# Patient Record
Sex: FEMALE | Race: WHITE | NOT HISPANIC OR LATINO | Employment: OTHER | URBAN - METROPOLITAN AREA
[De-identification: names, ages, dates, MRNs, and addresses within clinical notes are randomized per-mention and may not be internally consistent; named-entity substitution may affect disease eponyms.]

---

## 2023-12-01 ENCOUNTER — OFFICE VISIT (OUTPATIENT)
Dept: PODIATRY | Facility: CLINIC | Age: 73
End: 2023-12-01
Payer: COMMERCIAL

## 2023-12-01 DIAGNOSIS — M79.674 TOE PAIN, RIGHT: ICD-10-CM

## 2023-12-01 DIAGNOSIS — L84 CORNS AND CALLOSITIES: Primary | ICD-10-CM

## 2023-12-01 DIAGNOSIS — E11.42 DIABETIC POLYNEUROPATHY ASSOCIATED WITH TYPE 2 DIABETES MELLITUS (MULTI): ICD-10-CM

## 2023-12-01 DIAGNOSIS — M79.675 TOE PAIN, LEFT: ICD-10-CM

## 2023-12-01 DIAGNOSIS — B35.1 TINEA UNGUIUM: ICD-10-CM

## 2023-12-01 DIAGNOSIS — M79.671 RIGHT FOOT PAIN: ICD-10-CM

## 2023-12-01 PROCEDURE — 11055 PARING/CUTG B9 HYPRKER LES 1: CPT | Performed by: PODIATRIST

## 2023-12-01 PROCEDURE — 11721 DEBRIDE NAIL 6 OR MORE: CPT | Performed by: PODIATRIST

## 2023-12-01 RX ORDER — EZETIMIBE AND SIMVASTATIN 10; 10 MG/1; MG/1
1 TABLET ORAL NIGHTLY
COMMUNITY
Start: 2023-10-04 | End: 2024-09-28

## 2023-12-01 RX ORDER — VIT C/E/ZN/COPPR/LUTEIN/ZEAXAN 250MG-90MG
1000 CAPSULE ORAL
COMMUNITY
Start: 2021-05-24

## 2023-12-01 RX ORDER — GLIMEPIRIDE 4 MG/1
1 TABLET ORAL
COMMUNITY
Start: 2023-09-19 | End: 2024-03-17

## 2023-12-01 RX ORDER — KETOCONAZOLE 20 MG/G
CREAM TOPICAL
COMMUNITY
Start: 2023-10-04

## 2023-12-01 RX ORDER — SITAGLIPTIN 100 MG/1
100 TABLET, FILM COATED ORAL DAILY
COMMUNITY

## 2023-12-01 RX ORDER — LANCETS 30 GAUGE
EACH MISCELLANEOUS
COMMUNITY
Start: 2023-09-19

## 2023-12-01 RX ORDER — PANTOPRAZOLE SODIUM 40 MG/1
40 TABLET, DELAYED RELEASE ORAL
COMMUNITY
Start: 2023-10-04 | End: 2024-09-28

## 2023-12-01 RX ORDER — CELECOXIB 200 MG/1
200 CAPSULE ORAL
COMMUNITY
Start: 2023-10-04 | End: 2024-09-28

## 2023-12-01 RX ORDER — LANCETS 33 GAUGE
EACH MISCELLANEOUS
COMMUNITY
Start: 2023-09-19

## 2023-12-01 RX ORDER — METFORMIN HYDROCHLORIDE 500 MG/1
1 TABLET ORAL
COMMUNITY
Start: 2020-06-26 | End: 2024-11-01

## 2023-12-01 RX ORDER — SERTRALINE HYDROCHLORIDE 25 MG/1
25 TABLET, FILM COATED ORAL DAILY
COMMUNITY

## 2023-12-01 RX ORDER — TRIAMTERENE/HYDROCHLOROTHIAZID 37.5-25 MG
1 TABLET ORAL EVERY OTHER DAY
COMMUNITY
Start: 2023-10-04 | End: 2024-09-28

## 2023-12-01 RX ORDER — LEVOTHYROXINE SODIUM 50 UG/1
50 TABLET ORAL
COMMUNITY

## 2023-12-01 RX ORDER — CLOPIDOGREL BISULFATE 75 MG/1
75 TABLET ORAL DAILY
COMMUNITY

## 2023-12-01 NOTE — PROGRESS NOTES
CC:  painful thickened and elongated toenails and diabetic care.     HPI: Pt presents for dm foot exam and painful thickened and elongated toenails that are difficult to manage.  Onset was gradual with worsening course until recently.  Aggravated by shoe gear and ambulation.       PCP: Dr. Alvarez  Last visit: 10-4-23     PMH  No past medical history on file.  MEDS    Current Outpatient Medications:     celecoxib (CeleBREX) 200 mg capsule, Take 1 capsule (200 mg) by mouth once daily., Disp: , Rfl:     cholecalciferol (Vitamin D-3) 25 MCG (1000 UT) capsule, Take 1 capsule (25 mcg) by mouth once daily., Disp: , Rfl:     ezetimibe-simvastatin (Vytorin) 10-10 mg tablet, Take 1 tablet by mouth once daily at bedtime., Disp: , Rfl:     glimepiride (Amaryl) 4 mg tablet, Take 1 tablet (4 mg) by mouth once daily with a meal., Disp: , Rfl:     ketoconazole (NIZOral) 2 % cream, APPLY TOPICALLY TO THE AFFECTED AREA EVERY DAY, Disp: , Rfl:     metFORMIN (Glucophage) 500 mg tablet, Take 1 tablet (500 mg) by mouth 2 times a day with meals., Disp: , Rfl:     OneTouch Delica Plus Lancet 33 gauge misc, AS DIRECTED TWICE DAILY, Disp: , Rfl:     OneTouch Verio Flex meter misc, USE DAILY AS DIRECTED, Disp: , Rfl:     pantoprazole (ProtoNix) 40 mg EC tablet, Take 1 tablet (40 mg) by mouth., Disp: , Rfl:     triamterene-hydrochlorothiazid (Maxzide-25) 37.5-25 mg tablet, Take 1 tablet by mouth every other day., Disp: , Rfl:     clopidogrel (Plavix) 75 mg tablet, Take 1 tablet (75 mg) by mouth once daily., Disp: , Rfl:     Januvia 100 mg tablet, Take 1 tablet (100 mg) by mouth once daily., Disp: , Rfl:     levothyroxine (Synthroid, Levoxyl) 50 mcg tablet, Take 1 tablet (50 mcg) by mouth once daily in the morning. Take before meals., Disp: , Rfl:     sertraline (Zoloft) 25 mg tablet, Take 1 tablet (25 mg) by mouth once daily., Disp: , Rfl:   Allergies  Allergies   Allergen Reactions    Azithromycin GI Upset and Other     Burning upset stomach      Burning upset stomach    Penicillins Other    Statins-Hmg-Coa Reductase Inhibitors Other     Patient reports muscular trouble in hands with this med; Additionally, patient reports mother had lasting deficits after taking this med.    Patient reports muscular trouble in hands with this med.; Additionally, patient reports mother had lasting deficits after taking this med.     Social History     Socioeconomic History    Marital status:      Spouse name: Not on file    Number of children: Not on file    Years of education: Not on file    Highest education level: Not on file   Occupational History    Not on file   Tobacco Use    Smoking status: Never    Smokeless tobacco: Never   Substance and Sexual Activity    Alcohol use: Not on file    Drug use: Never    Sexual activity: Not on file   Other Topics Concern    Not on file   Social History Narrative    Not on file     Social Determinants of Health     Financial Resource Strain: Not on file   Food Insecurity: Not on file   Transportation Needs: Not on file   Physical Activity: Not on file   Stress: Not on file   Social Connections: Not on file   Intimate Partner Violence: Not on file   Housing Stability: Not on file     No family history on file.  No past surgical history on file.    REVIEW OF SYSTEMS  DERM:   + as noted in HPI.       Physical examination:   On General Observation: Patient is a pleasant, cooperative, well developed 73 y.o. diabetic   adult female. The patient is alert and oriented to time, place and person. Patient has normal affect and mood.  There were no vitals taken for this visit.    Vascular:  DP and PT pulses are 2/4 b/l.  mild edema noted. mild varicosities b/l.  CFT  6 seconds to all digits bilateral.  Skin temperature is warm to warm from proximal to distal bilateral.      Muscular: Strength is 5/5 for all instrinsic and extrinsic muscle groups.     Neuro:  Proprioception decreased.   Sensation to vibration is  decreased. Protective  sensation decreased  at all pedal sites via [place] 5.07 monofilament bilateral.  Light touch decreased bilateral.     Derm:    Left toenails: 1-5 Brittleness, crumbling upon debridement, subungual debris, elongation, mycotic appearance, tenderness, and thickness.   Right toenails: 1-5 Brittleness, crumbling upon debridement, subungual debris, elongation, mycotic appearance, tenderness, and thickness.   Hair growth is decreased b/l le  Callus right hallux ipj    ASSESSMENT:    Callus right foot  Pain right foot    Tinea Unguium [B35.1]   E11.42  Pain in right toe(s) [M79.674]   Pain in left toe(s) [M79.675]       PLAN:   Dm foot exam  Debrided callus right hallux with sharp debridement  Debrided nails 1-10 in length and height  Follow up 9 weeks      Fransico Babb DPM

## 2024-03-01 ENCOUNTER — PROCEDURE VISIT (OUTPATIENT)
Dept: PODIATRY | Facility: CLINIC | Age: 74
End: 2024-03-01
Payer: COMMERCIAL

## 2024-03-01 DIAGNOSIS — E11.42 DIABETIC POLYNEUROPATHY ASSOCIATED WITH TYPE 2 DIABETES MELLITUS (MULTI): ICD-10-CM

## 2024-03-01 DIAGNOSIS — B35.1 TINEA UNGUIUM: ICD-10-CM

## 2024-03-01 DIAGNOSIS — M79.675 TOE PAIN, LEFT: ICD-10-CM

## 2024-03-01 DIAGNOSIS — M79.674 TOE PAIN, RIGHT: Primary | ICD-10-CM

## 2024-03-01 PROCEDURE — 11721 DEBRIDE NAIL 6 OR MORE: CPT | Performed by: PODIATRIST

## 2024-03-01 NOTE — PROGRESS NOTES
CC:  painful thickened and elongated toenails and diabetic care.     HPI: Pt presents for dm foot exam and painful thickened and elongated toenails that are difficult to manage.  Onset was gradual with worsening course until recently.  Aggravated by shoe gear and ambulation.  Sugars are a bit elevated      PCP: Dr. Alvarez  Last visit: 11-13-23     PMH  No past medical history on file.  MEDS    Current Outpatient Medications:     celecoxib (CeleBREX) 200 mg capsule, Take 1 capsule (200 mg) by mouth once daily., Disp: , Rfl:     cholecalciferol (Vitamin D-3) 25 MCG (1000 UT) capsule, Take 1 capsule (25 mcg) by mouth once daily., Disp: , Rfl:     clopidogrel (Plavix) 75 mg tablet, Take 1 tablet (75 mg) by mouth once daily., Disp: , Rfl:     ezetimibe-simvastatin (Vytorin) 10-10 mg tablet, Take 1 tablet by mouth once daily at bedtime., Disp: , Rfl:     glimepiride (Amaryl) 4 mg tablet, Take 1 tablet (4 mg) by mouth once daily with a meal., Disp: , Rfl:     Januvia 100 mg tablet, Take 1 tablet (100 mg) by mouth once daily., Disp: , Rfl:     ketoconazole (NIZOral) 2 % cream, APPLY TOPICALLY TO THE AFFECTED AREA EVERY DAY, Disp: , Rfl:     levothyroxine (Synthroid, Levoxyl) 50 mcg tablet, Take 1 tablet (50 mcg) by mouth once daily in the morning. Take before meals., Disp: , Rfl:     metFORMIN (Glucophage) 500 mg tablet, Take 1 tablet (500 mg) by mouth 2 times a day with meals., Disp: , Rfl:     OneTouch Delica Plus Lancet 33 gauge misc, AS DIRECTED TWICE DAILY, Disp: , Rfl:     OneTouch Verio Flex meter misc, USE DAILY AS DIRECTED, Disp: , Rfl:     pantoprazole (ProtoNix) 40 mg EC tablet, Take 1 tablet (40 mg) by mouth., Disp: , Rfl:     sertraline (Zoloft) 25 mg tablet, Take 1 tablet (25 mg) by mouth once daily., Disp: , Rfl:     triamterene-hydrochlorothiazid (Maxzide-25) 37.5-25 mg tablet, Take 1 tablet by mouth every other day., Disp: , Rfl:   Allergies  Allergies   Allergen Reactions    Azithromycin GI Upset and Other      Burning upset stomach     Burning upset stomach    Penicillins Other    Statins-Hmg-Coa Reductase Inhibitors Other     Patient reports muscular trouble in hands with this med; Additionally, patient reports mother had lasting deficits after taking this med.    Patient reports muscular trouble in hands with this med.; Additionally, patient reports mother had lasting deficits after taking this med.     Social History     Socioeconomic History    Marital status:      Spouse name: Not on file    Number of children: Not on file    Years of education: Not on file    Highest education level: Not on file   Occupational History    Not on file   Tobacco Use    Smoking status: Never    Smokeless tobacco: Never   Substance and Sexual Activity    Alcohol use: Not on file    Drug use: Never    Sexual activity: Not on file   Other Topics Concern    Not on file   Social History Narrative    Not on file     Social Determinants of Health     Financial Resource Strain: Not on file   Food Insecurity: Not on file   Transportation Needs: Not on file   Physical Activity: Not on file   Stress: Not on file   Social Connections: Not on file   Intimate Partner Violence: Not on file   Housing Stability: Not on file     No family history on file.  No past surgical history on file.    REVIEW OF SYSTEMS  DERM:   + as noted in HPI.       Physical examination:   On General Observation: Patient is a pleasant, cooperative, well developed 73 y.o. diabetic   adult female. The patient is alert and oriented to time, place and person. Patient has normal affect and mood.  There were no vitals taken for this visit.    Vascular:  DP and PT pulses are 2/4 b/l.  mild edema noted. mild varicosities b/l.  CFT  6 seconds to all digits bilateral.  Skin temperature is warm to warm from proximal to distal bilateral.      Muscular: Strength is 5/5 for all instrinsic and extrinsic muscle groups.     Neuro:  Proprioception decreased.   Sensation to vibration  is  decreased. Protective sensation decreased  at all pedal sites via [place] 5.07 monofilament bilateral.  Light touch present bilateral.     Derm:    Left toenails: 1-5 Brittleness, crumbling upon debridement, subungual debris, elongation, mycotic appearance, tenderness, and thickness.   Right toenails: 1-5 Brittleness, crumbling upon debridement, subungual debris, elongation, mycotic appearance, tenderness, and thickness.   Hair growth is decreased b/l le    ASSESSMENT:    Tinea Unguium [B35.1]   E11.42  Pain in right toe(s) [M79.674]   Pain in left toe(s) [M79.675]       PLAN:   DM foot care and DM foot manifestations were reviewed.  The patient was educated on clinical findings, diagnosis and treatment plans. Patient understands all that has been explained and all questions were answered to apparent satisfaction.     - Debrided toenails 1-10 in length and height.   - Follow up in 9-12 weeks.       Fransico Babb DPM

## 2024-06-06 ENCOUNTER — PROCEDURE VISIT (OUTPATIENT)
Dept: PODIATRY | Facility: CLINIC | Age: 74
End: 2024-06-06
Payer: COMMERCIAL

## 2024-06-06 DIAGNOSIS — M79.675 TOE PAIN, LEFT: ICD-10-CM

## 2024-06-06 DIAGNOSIS — M79.674 TOE PAIN, RIGHT: ICD-10-CM

## 2024-06-06 DIAGNOSIS — B35.1 TINEA UNGUIUM: ICD-10-CM

## 2024-06-06 DIAGNOSIS — E11.65 POORLY CONTROLLED DIABETES MELLITUS (MULTI): Primary | ICD-10-CM

## 2024-06-06 PROCEDURE — 11721 DEBRIDE NAIL 6 OR MORE: CPT | Performed by: PODIATRIST

## 2024-06-06 NOTE — PROGRESS NOTES
CC:  painful thickened and elongated toenails and diabetic care.      HPI: Pt presents for dm foot exam and painful thickened and elongated toenails that are difficult to manage.  Onset was gradual with worsening course until recently.  Aggravated by shoe gear and ambulation.  Sugars are a bit elevated since the CGM.        PCP: Dr. Paiz  Last visit: 5-31-24     PMH  Medical History   No past medical history on file.     MEDS     Current Outpatient Medications:     celecoxib (CeleBREX) 200 mg capsule, Take 1 capsule (200 mg) by mouth once daily., Disp: , Rfl:     cholecalciferol (Vitamin D-3) 25 MCG (1000 UT) capsule, Take 1 capsule (25 mcg) by mouth once daily., Disp: , Rfl:     clopidogrel (Plavix) 75 mg tablet, Take 1 tablet (75 mg) by mouth once daily., Disp: , Rfl:     ezetimibe-simvastatin (Vytorin) 10-10 mg tablet, Take 1 tablet by mouth once daily at bedtime., Disp: , Rfl:     glimepiride (Amaryl) 4 mg tablet, Take 1 tablet (4 mg) by mouth once daily with a meal., Disp: , Rfl:     Januvia 100 mg tablet, Take 1 tablet (100 mg) by mouth once daily., Disp: , Rfl:     ketoconazole (NIZOral) 2 % cream, APPLY TOPICALLY TO THE AFFECTED AREA EVERY DAY, Disp: , Rfl:     levothyroxine (Synthroid, Levoxyl) 50 mcg tablet, Take 1 tablet (50 mcg) by mouth once daily in the morning. Take before meals., Disp: , Rfl:     metFORMIN (Glucophage) 500 mg tablet, Take 1 tablet (500 mg) by mouth 2 times a day with meals., Disp: , Rfl:     OneTouch Delica Plus Lancet 33 gauge misc, AS DIRECTED TWICE DAILY, Disp: , Rfl:     OneTouch Verio Flex meter misc, USE DAILY AS DIRECTED, Disp: , Rfl:     pantoprazole (ProtoNix) 40 mg EC tablet, Take 1 tablet (40 mg) by mouth., Disp: , Rfl:     sertraline (Zoloft) 25 mg tablet, Take 1 tablet (25 mg) by mouth once daily., Disp: , Rfl:     triamterene-hydrochlorothiazid (Maxzide-25) 37.5-25 mg tablet, Take 1 tablet by mouth every other day., Disp: , Rfl:   Allergies        Allergies    Allergen Reactions    Azithromycin GI Upset and Other       Burning upset stomach      Burning upset stomach    Penicillins Other    Statins-Hmg-Coa Reductase Inhibitors Other       Patient reports muscular trouble in hands with this med; Additionally, patient reports mother had lasting deficits after taking this med.     Patient reports muscular trouble in hands with this med.; Additionally, patient reports mother had lasting deficits after taking this med.      Social History   Social History            Socioeconomic History    Marital status:        Spouse name: Not on file    Number of children: Not on file    Years of education: Not on file    Highest education level: Not on file   Occupational History    Not on file   Tobacco Use    Smoking status: Never    Smokeless tobacco: Never   Substance and Sexual Activity    Alcohol use: Not on file    Drug use: Never    Sexual activity: Not on file   Other Topics Concern    Not on file   Social History Narrative    Not on file      Social Determinants of Health      Financial Resource Strain: Not on file   Food Insecurity: Not on file   Transportation Needs: Not on file   Physical Activity: Not on file   Stress: Not on file   Social Connections: Not on file   Intimate Partner Violence: Not on file   Housing Stability: Not on file         Family History   No family history on file.     Surgical History   No past surgical history on file.        REVIEW OF SYSTEMS  DERM:   + as noted in HPI.         Physical examination:   On General Observation: Patient is a pleasant, cooperative, well developed 73 y.o. diabetic   adult female. The patient is alert and oriented to time, place and person. Patient has normal affect and mood.  There were no vitals taken for this visit.     Vascular:  DP and PT pulses are 2/4 b/l.  mild edema noted. mild varicosities b/l.  CFT  6 seconds to all digits bilateral.  Skin temperature is warm to warm from proximal to distal bilateral.        Muscular: Strength is 5/5 for all instrinsic and extrinsic muscle groups.      Neuro:  Proprioception decreased.   Sensation to vibration is  decreased. Protective sensation decreased  at all pedal sites via [place] 5.07 monofilament bilateral.  Light touch present bilateral.      Derm:    Left toenails: 1-5 Brittleness, crumbling upon debridement, subungual debris, elongation, mycotic appearance, tenderness, and thickness.   Right toenails: 1-5 Brittleness, crumbling upon debridement, subungual debris, elongation, mycotic appearance, tenderness, and thickness.   Hair growth is decreased b/l le     ASSESSMENT:    Tinea Unguium [B35.1]   E11.42  Pain in right toe(s) [M79.674]   Pain in left toe(s) [M79.675]         PLAN:   DM foot care and DM foot manifestations were reviewed.  The patient was educated on clinical findings, diagnosis and treatment plans. Patient understands all that has been explained and all questions were answered to apparent satisfaction.      - Debrided toenails 1-10 in length and height.   - Follow up in 9-12 weeks.         Fransico Babb DPM

## 2024-09-12 ENCOUNTER — APPOINTMENT (OUTPATIENT)
Dept: PODIATRY | Facility: CLINIC | Age: 74
End: 2024-09-12
Payer: COMMERCIAL

## 2024-09-12 DIAGNOSIS — M79.674 TOE PAIN, RIGHT: Primary | ICD-10-CM

## 2024-09-12 DIAGNOSIS — I89.0 LYMPH EDEMA: ICD-10-CM

## 2024-09-12 DIAGNOSIS — E11.65 POORLY CONTROLLED DIABETES MELLITUS (MULTI): ICD-10-CM

## 2024-09-12 DIAGNOSIS — B35.1 TINEA UNGUIUM: ICD-10-CM

## 2024-09-12 DIAGNOSIS — M79.675 TOE PAIN, LEFT: ICD-10-CM

## 2024-09-12 PROCEDURE — 1159F MED LIST DOCD IN RCRD: CPT | Performed by: PODIATRIST

## 2024-09-12 PROCEDURE — 1160F RVW MEDS BY RX/DR IN RCRD: CPT | Performed by: PODIATRIST

## 2024-09-12 PROCEDURE — 1036F TOBACCO NON-USER: CPT | Performed by: PODIATRIST

## 2024-09-12 PROCEDURE — 11721 DEBRIDE NAIL 6 OR MORE: CPT | Performed by: PODIATRIST

## 2024-09-12 PROCEDURE — 99212 OFFICE O/P EST SF 10 MIN: CPT | Performed by: PODIATRIST

## 2024-09-12 NOTE — PROGRESS NOTES
CC:  painful thickened and elongated toenails and diabetic care.      HPI: Pt presents for dm foot exam and painful thickened and elongated toenails that are difficult to manage.  Onset was gradual with worsening course until recently.  Aggravated by shoe gear and ambulation.  Sugars are a bit elevated since the CGM. Has some swelling to the feet.        PCP: Dr. Paiz  Last visit: 5-31-24     PMH  Medical History   No past medical history on file.      MEDS     Current Outpatient Medications:     celecoxib (CeleBREX) 200 mg capsule, Take 1 capsule (200 mg) by mouth once daily., Disp: , Rfl:     cholecalciferol (Vitamin D-3) 25 MCG (1000 UT) capsule, Take 1 capsule (25 mcg) by mouth once daily., Disp: , Rfl:     clopidogrel (Plavix) 75 mg tablet, Take 1 tablet (75 mg) by mouth once daily., Disp: , Rfl:     ezetimibe-simvastatin (Vytorin) 10-10 mg tablet, Take 1 tablet by mouth once daily at bedtime., Disp: , Rfl:     glimepiride (Amaryl) 4 mg tablet, Take 1 tablet (4 mg) by mouth once daily with a meal., Disp: , Rfl:     Januvia 100 mg tablet, Take 1 tablet (100 mg) by mouth once daily., Disp: , Rfl:     ketoconazole (NIZOral) 2 % cream, APPLY TOPICALLY TO THE AFFECTED AREA EVERY DAY, Disp: , Rfl:     levothyroxine (Synthroid, Levoxyl) 50 mcg tablet, Take 1 tablet (50 mcg) by mouth once daily in the morning. Take before meals., Disp: , Rfl:     metFORMIN (Glucophage) 500 mg tablet, Take 1 tablet (500 mg) by mouth 2 times a day with meals., Disp: , Rfl:     OneTouch Delica Plus Lancet 33 gauge misc, AS DIRECTED TWICE DAILY, Disp: , Rfl:     OneTouch Verio Flex meter misc, USE DAILY AS DIRECTED, Disp: , Rfl:     pantoprazole (ProtoNix) 40 mg EC tablet, Take 1 tablet (40 mg) by mouth., Disp: , Rfl:     sertraline (Zoloft) 25 mg tablet, Take 1 tablet (25 mg) by mouth once daily., Disp: , Rfl:     triamterene-hydrochlorothiazid (Maxzide-25) 37.5-25 mg tablet, Take 1 tablet by mouth every other day., Disp: , Rfl:    Allergies            Allergies   Allergen Reactions    Azithromycin GI Upset and Other       Burning upset stomach      Burning upset stomach    Penicillins Other    Statins-Hmg-Coa Reductase Inhibitors Other       Patient reports muscular trouble in hands with this med; Additionally, patient reports mother had lasting deficits after taking this med.     Patient reports muscular trouble in hands with this med.; Additionally, patient reports mother had lasting deficits after taking this med.      Social History   Social History                Socioeconomic History    Marital status:        Spouse name: Not on file    Number of children: Not on file    Years of education: Not on file    Highest education level: Not on file   Occupational History    Not on file   Tobacco Use    Smoking status: Never    Smokeless tobacco: Never   Substance and Sexual Activity    Alcohol use: Not on file    Drug use: Never    Sexual activity: Not on file   Other Topics Concern    Not on file   Social History Narrative    Not on file      Social Determinants of Health      Financial Resource Strain: Not on file   Food Insecurity: Not on file   Transportation Needs: Not on file   Physical Activity: Not on file   Stress: Not on file   Social Connections: Not on file   Intimate Partner Violence: Not on file   Housing Stability: Not on file         Family History   No family history on file.      Surgical History   No past surgical history on file.         REVIEW OF SYSTEMS  DERM:   + as noted in HPI.         Physical examination:   On General Observation: Patient is a pleasant, cooperative, well developed 73 y.o. diabetic   adult female. The patient is alert and oriented to time, place and person. Patient has normal affect and mood.  There were no vitals taken for this visit.     Vascular:  DP and PT pulses are 2/4 b/l.  mild edema noted. mild varicosities b/l.  CFT  6 seconds to all digits bilateral.  Skin temperature is warm to  warm from proximal to distal bilateral.       Muscular: Strength is 5/5 for all instrinsic and extrinsic muscle groups.      Neuro:  Proprioception decreased.   Sensation to vibration is  decreased. Protective sensation decreased  at all pedal sites via [place] 5.07 monofilament bilateral.  Light touch present bilateral.      Derm:    Left toenails: 1-5 Brittleness, crumbling upon debridement, subungual debris, elongation, mycotic appearance, tenderness, and thickness.   Right toenails: 1-5 Brittleness, crumbling upon debridement, subungual debris, elongation, mycotic appearance, tenderness, and thickness.   Hair growth is decreased b/l le     ASSESSMENT:    Lymphedema  Tinea Unguium [B35.1]   E11.42  Pain in right toe(s) [M79.674]   Pain in left toe(s) [M79.675]         PLAN:   Exam  Reviewed etiology of lymphedema with pt and options, recommend ot compression hose, elevation of the le.  DM foot care and DM foot manifestations were reviewed.  The patient was educated on clinical findings, diagnosis and treatment plans. Patient understands all that has been explained and all questions were answered to apparent satisfaction.      - Debrided toenails 1-10 in length and height.   - Follow up in 9-12 weeks.

## 2024-12-12 ENCOUNTER — APPOINTMENT (OUTPATIENT)
Dept: PODIATRY | Facility: CLINIC | Age: 74
End: 2024-12-12
Payer: COMMERCIAL

## 2025-03-28 ENCOUNTER — APPOINTMENT (OUTPATIENT)
Dept: PODIATRY | Facility: CLINIC | Age: 75
End: 2025-03-28
Payer: MEDICARE

## 2025-03-28 DIAGNOSIS — M21.372 FOOT DROP, LEFT FOOT: Primary | ICD-10-CM

## 2025-03-28 DIAGNOSIS — M79.675 TOE PAIN, LEFT: ICD-10-CM

## 2025-03-28 DIAGNOSIS — M79.674 TOE PAIN, RIGHT: ICD-10-CM

## 2025-03-28 DIAGNOSIS — B35.1 TINEA UNGUIUM: ICD-10-CM

## 2025-03-28 DIAGNOSIS — E11.42 DIABETIC POLYNEUROPATHY ASSOCIATED WITH TYPE 2 DIABETES MELLITUS: ICD-10-CM

## 2025-03-28 PROCEDURE — 1036F TOBACCO NON-USER: CPT | Performed by: PODIATRIST

## 2025-03-28 PROCEDURE — 1160F RVW MEDS BY RX/DR IN RCRD: CPT | Performed by: PODIATRIST

## 2025-03-28 PROCEDURE — 1159F MED LIST DOCD IN RCRD: CPT | Performed by: PODIATRIST

## 2025-03-28 PROCEDURE — 11721 DEBRIDE NAIL 6 OR MORE: CPT | Performed by: PODIATRIST

## 2025-03-28 PROCEDURE — 99213 OFFICE O/P EST LOW 20 MIN: CPT | Performed by: PODIATRIST

## 2025-03-31 NOTE — PROGRESS NOTES
CC:  painful thickened and elongated toenails and diabetic care.   Onset of drop foot left le since November.     HPI: Pt presents for dm foot exam and painful thickened and elongated toenails that are difficult to manage.  Onset was gradual with worsening course until recently.  Aggravated by shoe gear and ambulation.  Sugars are a bit elevated since the CGM. She is seen today also with foot drop since November, she has seen PT, given her a used AFO, needs rx for one.        PCP: Dr. Alvarez   Last visit: 2-10-25     PMH  Medical History   No past medical history on file.      MEDS     Current Outpatient Medications:     celecoxib (CeleBREX) 200 mg capsule, Take 1 capsule (200 mg) by mouth once daily., Disp: , Rfl:     cholecalciferol (Vitamin D-3) 25 MCG (1000 UT) capsule, Take 1 capsule (25 mcg) by mouth once daily., Disp: , Rfl:     clopidogrel (Plavix) 75 mg tablet, Take 1 tablet (75 mg) by mouth once daily., Disp: , Rfl:     ezetimibe-simvastatin (Vytorin) 10-10 mg tablet, Take 1 tablet by mouth once daily at bedtime., Disp: , Rfl:     glimepiride (Amaryl) 4 mg tablet, Take 1 tablet (4 mg) by mouth once daily with a meal., Disp: , Rfl:     Januvia 100 mg tablet, Take 1 tablet (100 mg) by mouth once daily., Disp: , Rfl:     ketoconazole (NIZOral) 2 % cream, APPLY TOPICALLY TO THE AFFECTED AREA EVERY DAY, Disp: , Rfl:     levothyroxine (Synthroid, Levoxyl) 50 mcg tablet, Take 1 tablet (50 mcg) by mouth once daily in the morning. Take before meals., Disp: , Rfl:     metFORMIN (Glucophage) 500 mg tablet, Take 1 tablet (500 mg) by mouth 2 times a day with meals., Disp: , Rfl:     OneTouch Delica Plus Lancet 33 gauge misc, AS DIRECTED TWICE DAILY, Disp: , Rfl:     OneTouch Verio Flex meter misc, USE DAILY AS DIRECTED, Disp: , Rfl:     pantoprazole (ProtoNix) 40 mg EC tablet, Take 1 tablet (40 mg) by mouth., Disp: , Rfl:     sertraline (Zoloft) 25 mg tablet, Take 1 tablet (25 mg) by mouth once daily., Disp: , Rfl:      triamterene-hydrochlorothiazid (Maxzide-25) 37.5-25 mg tablet, Take 1 tablet by mouth every other day., Disp: , Rfl:   Allergies            Allergies   Allergen Reactions    Azithromycin GI Upset and Other       Burning upset stomach      Burning upset stomach    Penicillins Other    Statins-Hmg-Coa Reductase Inhibitors Other       Patient reports muscular trouble in hands with this med; Additionally, patient reports mother had lasting deficits after taking this med.     Patient reports muscular trouble in hands with this med.; Additionally, patient reports mother had lasting deficits after taking this med.      Social History   Social History                Socioeconomic History    Marital status:        Spouse name: Not on file    Number of children: Not on file    Years of education: Not on file    Highest education level: Not on file   Occupational History    Not on file   Tobacco Use    Smoking status: Never    Smokeless tobacco: Never   Substance and Sexual Activity    Alcohol use: Not on file    Drug use: Never    Sexual activity: Not on file   Other Topics Concern    Not on file   Social History Narrative    Not on file      Social Determinants of Health      Financial Resource Strain: Not on file   Food Insecurity: Not on file   Transportation Needs: Not on file   Physical Activity: Not on file   Stress: Not on file   Social Connections: Not on file   Intimate Partner Violence: Not on file   Housing Stability: Not on file         Family History   No family history on file.      Surgical History   No past surgical history on file.         REVIEW OF SYSTEMS  DERM:   + as noted in HPI.         Physical examination:   On General Observation: Patient is a pleasant, cooperative, well developed 73 y.o. diabetic   adult female. The patient is alert and oriented to time, place and person. Patient has normal affect and mood.  There were no vitals taken for this visit.     Vascular:  DP and PT pulses are 2/4  b/l.  mild edema noted. mild varicosities b/l.  CFT  6 seconds to all digits bilateral.  Skin temperature is warm to warm from proximal to distal bilateral.       Muscular: Strength is 1/5 for all instrinsic and extrinsic muscle groups left le     Neuro:  Proprioception decreased.   Sensation to vibration is  decreased. Protective sensation decreased  at all pedal sites via [place] 5.07 monofilament bilateral.  Light touch present bilateral.      Derm:    Left toenails: 1-5 Brittleness, crumbling upon debridement, subungual debris, elongation, mycotic appearance, tenderness, and thickness.   Right toenails: 1-5 Brittleness, crumbling upon debridement, subungual debris, elongation, mycotic appearance, tenderness, and thickness.   Hair growth is decreased b/l le     Ortho:  Rom is stable right le for the 1st mpj, mtj,s tj, atj, left le is absent to DF and PF of the left foot and ankle.    AIC 7.7 12-16-25    ASSESSMENT:    Foot drop  Tinea Unguium [B35.1]   E11.42  Pain in right toe(s) [M79.674]   Pain in left toe(s) [M79.675]         PLAN:   -Exam  On exam pt has foot drop of the left le, she is dm with neuropathy, she has seen PT, reviewed reasons possible stroke vs nerve injury, I recommend her to go to er asap to be eval for stroke and full workup, if neg then would recommend ncv/emg or neuro referral for further workup.  -Gave her rx for AFO at [facility].  DM foot care and DM foot manifestations were reviewed.  The patient was educated on clinical findings, diagnosis and treatment plans. Patient understands all that has been explained and all questions were answered to apparent satisfaction.   -Reviewed last note from PCP and last AIC elevated, could be cause for stroke or nerve issues.  - Debrided toenails 1-10 in length and height.   - Follow up in 9-12 weeks.         Fransico Babb DPM

## 2025-04-07 ENCOUNTER — TELEPHONE (OUTPATIENT)
Dept: PRIMARY CARE | Facility: CLINIC | Age: 75
End: 2025-04-07
Payer: MEDICARE